# Patient Record
Sex: FEMALE | Race: WHITE | Employment: FULL TIME | URBAN - METROPOLITAN AREA
[De-identification: names, ages, dates, MRNs, and addresses within clinical notes are randomized per-mention and may not be internally consistent; named-entity substitution may affect disease eponyms.]

---

## 2024-09-09 ENCOUNTER — CONSULT (OUTPATIENT)
Dept: GASTROENTEROLOGY | Facility: CLINIC | Age: 44
End: 2024-09-09
Payer: COMMERCIAL

## 2024-09-09 VITALS
WEIGHT: 155 LBS | SYSTOLIC BLOOD PRESSURE: 132 MMHG | BODY MASS INDEX: 22.19 KG/M2 | DIASTOLIC BLOOD PRESSURE: 93 MMHG | HEART RATE: 101 BPM | HEIGHT: 70 IN

## 2024-09-09 DIAGNOSIS — R14.0 ABDOMINAL BLOATING: ICD-10-CM

## 2024-09-09 DIAGNOSIS — R17 JAUNDICE: Primary | ICD-10-CM

## 2024-09-09 DIAGNOSIS — D69.6 THROMBOCYTOPENIA (HCC): ICD-10-CM

## 2024-09-09 DIAGNOSIS — F10.10 ALCOHOL ABUSE: ICD-10-CM

## 2024-09-09 DIAGNOSIS — K70.9 ALCOHOLIC LIVER DISEASE (HCC): ICD-10-CM

## 2024-09-09 PROCEDURE — 99204 OFFICE O/P NEW MOD 45 MIN: CPT | Performed by: NURSE PRACTITIONER

## 2024-09-09 RX ORDER — FUROSEMIDE 20 MG
20 TABLET ORAL
COMMUNITY
Start: 2024-08-12 | End: 2024-09-09 | Stop reason: SDUPTHER

## 2024-09-09 RX ORDER — SPIRONOLACTONE 50 MG/1
50 TABLET, FILM COATED ORAL
COMMUNITY
Start: 2024-08-12 | End: 2024-09-09 | Stop reason: SDUPTHER

## 2024-09-09 RX ORDER — FUROSEMIDE 20 MG
20 TABLET ORAL DAILY
Qty: 30 TABLET | Refills: 0 | Status: SHIPPED | OUTPATIENT
Start: 2024-09-09

## 2024-09-09 RX ORDER — SPIRONOLACTONE 50 MG/1
50 TABLET, FILM COATED ORAL DAILY
Qty: 30 TABLET | Refills: 0 | Status: SHIPPED | OUTPATIENT
Start: 2024-09-09 | End: 2024-10-09

## 2024-09-09 NOTE — PROGRESS NOTES
[facility] [place] - Outpatient Consultation  Juli Matute 44 y.o. female MRN: [id number]  Encounter: [id number]          ASSESSMENT AND PLAN:      1. Jaundice  2. Abdominal bloating  3. Alcohol abuse  4. Thrombocytopenia  Pt with history of alcohol use disorder with recent development of jaundice, abdominal bloating, and lower extremity swelling. She went to [facility] [place] and had labs done 8/6/2024 which showed a total bilirubin of 6.3, direct bilirubin 4.62, AST 86, ALT 30, alkaline phosphatase 264, albumin 3.4.  Platelet count low at 163.  Hepatitis B core total antibody negative, hepatitis B surface antigen negative, hepatitis B surface antibody nonreactive, HCV antibody nonreactive.  She was prescribed Lasix 20 mg/Aldactone 50 mg daily but then was advised to double the dose and ran out after 2 weeks so stopped taking it.  She weaned herself off alcohol slowly and has not had any alcohol for 1 month.  Reports swelling has improved in her lower extremities, however she continues with abdominal bloating and notes the recent development of a dry cough so has been sleeping with head of bed elevated.  She has not had any abdominal imaging and refuses to do so until labs are repeated as she is afraid of what may be seen.  She is, however, agreeable to getting an ultrasound limited.  She denies any abdominal pain.  Discussed labs may be indicative of alcoholic hepatitis +/- cirrhosis, but will need further imaging to rule out any underlying lesions and further evaluation of the biliary tree.  She again refused until repeat labs are drawn.  No PT/INR available to calculate Madrey's discriminant function score or MELD at this time.    -Continue complete alcohol cessation  -Check US limited, obtain IR therapeutic/diagnostic paracentesis  -Restart Lasix 20mg daily, Aldactone 50mg daily.  -Recheck MELD labs in 1 week  -Check hepatitis A Ab, recommend immunization if no antibodies  along with vaccination for hepatitis A  -Nutritionist referral given for info regarding 2g sodium diet & optimizing nutritional status   -Pt refusing imaging of the liver & lungs at todays visit, will reconsider after labs obtained; would then recommend MRI abdomen with and without contrast MRCP as well as chest x-ray  -Follow up with hepatology at next visit  -Will need EGD for variceal surveillance  -Complications of cirrhosis discussed such as hepatic encephalopathy, bleeding, HCC, SBP, and decompensation with continued use of alcohol    -     US abdomen limited; Future; Expected date: 09/09/2024  -     AFP tumor marker; Future  -     IR AMB Paracentesis  -     furosemide (LASIX) 20 mg tablet; Take 1 tablet (20 mg total) by mouth daily  -     spironolactone (ALDACTONE) 50 mg tablet; Take 1 tablet (50 mg total) by mouth daily  -     Ambulatory Referral to Nutrition Services; Future            ______________________________________________________________________    HPI:  Juli Matute is a 44 y.o. female with history of alcohol abuse who presents to establish care due to abnormal LFTs.  She recently started noting jaundice, scleral icterus, abdominal bloating, and lower extremity swelling a couple months ago. She was seen by [facility] and had labs/started on Lasix/Aldactone. Reports she only took for 2 weeks and then stopped when she ran out. She has now weaned off alcohol use for 1 month by herself & has noted swelling has improved, but still present in her abdomen, R ankle, and has noted a cough.    She has a longstanding history alcohol abuse going back to her 20s-30s and used to drink Vodka. She was hospitalized in the past for alcoholic hepatitis at age 29-30 and then went to [facility]. She has been able to decrease alcohol use for periods of time, however over the pandemic, she switched to wine & worked from home & subsequently was drinking heavier.    She denies any confusion, black,  "bloody stool, hematemesis, pruritus, easy bleeding/bruising, nosebleeds. Reports 3 bowel movements daily at baseline.     drinks as well, but she reports he has been supportive of her quitting.  Father has a history of alcoholism    She reports her last colonoscopy 7-8 years ago due to rectal bleeding, was found to have hemorrhoids. Denies any family history of colon cancer.  Has never had an EGD,    Denies IVDU. Former tobacco use. Uses Marijuana.     REVIEW OF SYSTEMS:    CONSTITUTIONAL: Denies any fever, chills, rigors, and weight loss.  HEENT: No earache or tinnitus.  CARDIOVASCULAR: No chest pain or palpitations.   RESPIRATORY: Denies any cough, hemoptysis, shortness of breath or dyspnea on exertion.  GASTROINTESTINAL: As noted in the History of Present Illness.   GENITOURINARY: Denies any hematuria or dysuria.  NEUROLOGIC: No dizziness or vertigo.   MUSCULOSKELETAL: Denies any joint swellings.  SKIN: Denies skin rashes or itching.   ENDOCRINE: Denies excessive thirst. Denies intolerance to heat or cold.  PSYCHOSOCIAL: Denies depression or anxiety. Denies any recent memory loss.       Historical Information   History reviewed. No pertinent past medical history.  Past Surgical History:   Procedure Laterality Date    COLONOSCOPY       Social History   Social History     Substance and Sexual Activity   Alcohol Use None     Social History     Substance and Sexual Activity   Drug Use Yes    Types: Marijuana    Comment: smokes intermittently     Social History     Tobacco Use   Smoking Status Never   Smokeless Tobacco Never     History reviewed. No pertinent family history.    Meds/Allergies       Current Outpatient Medications:     furosemide (LASIX) 20 mg tablet    spironolactone (ALDACTONE) 50 mg tablet    Allergies   Allergen Reactions    Seasonal Ic [Octacosanol] Allergic Rhinitis           Objective     Blood pressure 132/93, pulse 101, height 5' 10\" (1.778 m), weight 70.3 kg (155 lb). Body mass index is " 22.24 kg/m².        PHYSICAL EXAM:      General Appearance:   Alert, cooperative, no distress; scleral icterus present   HEENT:   Normocephalic, atraumatic, anicteric.     Neck:  Supple, symmetrical, trachea midline   Lungs:   Decreased to auscultation bilaterally; no rales, rhonchi or wheezing; respirations unlabored    Heart::   Regular rate and rhythm; no murmur.   Abdomen:   Soft, non-tender, non-distended; normal bowel sounds; no masses, no organomegaly ; ascites present   Genitalia:   Deferred    Rectal:   Deferred    Extremities:  No cyanosis, clubbing; right ankle trace edema    Skin:  jaundice present   Lymph nodes:  No palpable cervical lymphadenopathy        Lab Results:   No visits with results within 1 Day(s) from this visit.   Latest known visit with results is:   No results found for any previous visit.         Radiology Results:   No results found.

## 2024-09-12 ENCOUNTER — APPOINTMENT (OUTPATIENT)
Dept: LAB | Facility: HOSPITAL | Age: 44
End: 2024-09-12
Payer: COMMERCIAL

## 2024-09-12 ENCOUNTER — HOSPITAL ENCOUNTER (OUTPATIENT)
Dept: RADIOLOGY | Facility: HOSPITAL | Age: 44
Discharge: HOME | End: 2024-09-12
Payer: COMMERCIAL

## 2024-09-12 DIAGNOSIS — R14.0 ABDOMINAL BLOATING: ICD-10-CM

## 2024-09-12 DIAGNOSIS — R17 JAUNDICE: ICD-10-CM

## 2024-09-12 LAB
BASOPHILS # BLD AUTO: 0.05 THOUSANDS/ΜL (ref 0–0.1)
BASOPHILS NFR BLD AUTO: 1 % (ref 0–1)
EOSINOPHIL # BLD AUTO: 0.14 THOUSAND/ΜL (ref 0–0.61)
EOSINOPHIL NFR BLD AUTO: 2 % (ref 0–6)
ERYTHROCYTE [DISTWIDTH] IN BLOOD BY AUTOMATED COUNT: 14.7 % (ref 11.6–15.1)
HCT VFR BLD AUTO: 35.2 % (ref 34.8–46.1)
HGB BLD-MCNC: 11.7 G/DL (ref 11.5–15.4)
IMM GRANULOCYTES # BLD AUTO: 0.02 THOUSAND/UL (ref 0–0.2)
IMM GRANULOCYTES NFR BLD AUTO: 0 % (ref 0–2)
INR PPP: 1.48 (ref 0.85–1.19)
LYMPHOCYTES # BLD AUTO: 1.62 THOUSANDS/ΜL (ref 0.6–4.47)
LYMPHOCYTES NFR BLD AUTO: 25 % (ref 14–44)
MCH RBC QN AUTO: 37.1 PG (ref 26.8–34.3)
MCHC RBC AUTO-ENTMCNC: 33.2 G/DL (ref 31.4–37.4)
MCV RBC AUTO: 112 FL (ref 82–98)
MONOCYTES # BLD AUTO: 0.6 THOUSAND/ΜL (ref 0.17–1.22)
MONOCYTES NFR BLD AUTO: 9 % (ref 4–12)
NEUTROPHILS # BLD AUTO: 4.07 THOUSANDS/ΜL (ref 1.85–7.62)
NEUTS SEG NFR BLD AUTO: 63 % (ref 43–75)
NRBC BLD AUTO-RTO: 0 /100 WBCS
PLATELET # BLD AUTO: 149 THOUSANDS/UL (ref 149–390)
PMV BLD AUTO: 9.9 FL (ref 8.9–12.7)
PROTHROMBIN TIME: 18.4 SECONDS (ref 12.3–15)
RBC # BLD AUTO: 3.15 MILLION/UL (ref 3.81–5.12)
WBC # BLD AUTO: 6.5 THOUSAND/UL (ref 4.31–10.16)

## 2024-09-12 PROCEDURE — 85610 PROTHROMBIN TIME: CPT

## 2024-09-12 PROCEDURE — 76705 ECHO EXAM OF ABDOMEN: CPT

## 2024-09-12 PROCEDURE — 85025 COMPLETE CBC W/AUTO DIFF WBC: CPT

## 2024-09-12 PROCEDURE — 36415 COLL VENOUS BLD VENIPUNCTURE: CPT

## 2024-09-13 ENCOUNTER — TELEPHONE (OUTPATIENT)
Dept: GASTROENTEROLOGY | Facility: CLINIC | Age: 44
End: 2024-09-13

## 2024-09-13 NOTE — TELEPHONE ENCOUNTER
Called and relayed results and recommendations to patient  She stated she did not get all the labs done; only got what was required for her upcoming Paracentesis but will get them done after this appt

## 2024-09-13 NOTE — TELEPHONE ENCOUNTER
----- Message from Rhonda DEJESUS sent at 9/13/2024  8:49 AM EDT -----    ----- Message -----  From: ANGELINE Alanis  Sent: 9/12/2024   2:35 PM EDT  To: [facility] [address] [place]    Please let pt know we received results of her CBC & PT/INR but no other blood work results yet. Please check to see if she had it done and call the lab where she had it done to obtain the other results. CBC showed normal blood counts, no signs of anemia, platelet count was low normal. PT/INR was a little elevated which may indicate synthetic dysfunction of the liver, but can also be affected by malnutrition as well. Limited US performed did show moderate amount of ascites (fluid) in the abdomen so she should call IR to schedule paracentesis as ordered in the office if she hasn't already. Thank you

## 2024-09-16 ENCOUNTER — PREP FOR PROCEDURE (OUTPATIENT)
Dept: OTHER | Facility: HOSPITAL | Age: 44
End: 2024-09-16

## 2024-09-16 ENCOUNTER — HOSPITAL ENCOUNTER (OUTPATIENT)
Dept: NON INVASIVE DIAGNOSTICS | Facility: HOSPITAL | Age: 44
Discharge: HOME | End: 2024-09-16
Admitting: RADIOLOGY
Payer: COMMERCIAL

## 2024-09-16 VITALS
HEART RATE: 76 BPM | RESPIRATION RATE: 18 BRPM | DIASTOLIC BLOOD PRESSURE: 82 MMHG | OXYGEN SATURATION: 99 % | SYSTOLIC BLOOD PRESSURE: 127 MMHG

## 2024-09-16 DIAGNOSIS — K70.9 ALCOHOLIC LIVER DISEASE (HCC): Primary | ICD-10-CM

## 2024-09-16 LAB
ALBUMIN FLD-MCNC: <1.5 G/DL
APPEARANCE FLD: NORMAL
COLOR FLD: YELLOW
HISTIOCYTES NFR FLD: 62 %
LYMPHOCYTES NFR BLD AUTO: 10 %
MONO+MESO NFR FLD MANUAL: 9 %
NEUTS BAND NFR FLD MANUAL: 2 %
NEUTS SEG NFR BLD AUTO: 17 %
PROT FLD-MCNC: <3 G/DL
SITE: NORMAL
TOTAL CELLS COUNTED SPEC: 100
TOTAL PROTEIN FLUID: 1.4 G/DL
WBC # FLD MANUAL: 253 /UL

## 2024-09-16 PROCEDURE — 87070 CULTURE OTHR SPECIMN AEROBIC: CPT | Performed by: NURSE PRACTITIONER

## 2024-09-16 PROCEDURE — 82042 OTHER SOURCE ALBUMIN QUAN EA: CPT | Performed by: NURSE PRACTITIONER

## 2024-09-16 PROCEDURE — 88112 CYTOPATH CELL ENHANCE TECH: CPT | Performed by: PATHOLOGY

## 2024-09-16 PROCEDURE — 88305 TISSUE EXAM BY PATHOLOGIST: CPT | Performed by: PATHOLOGY

## 2024-09-16 PROCEDURE — 49083 ABD PARACENTESIS W/IMAGING: CPT

## 2024-09-16 PROCEDURE — 87205 SMEAR GRAM STAIN: CPT | Performed by: NURSE PRACTITIONER

## 2024-09-16 PROCEDURE — 88341 IMHCHEM/IMCYTCHM EA ADD ANTB: CPT | Performed by: PATHOLOGY

## 2024-09-16 PROCEDURE — 88342 IMHCHEM/IMCYTCHM 1ST ANTB: CPT | Performed by: PATHOLOGY

## 2024-09-16 PROCEDURE — 89051 BODY FLUID CELL COUNT: CPT | Performed by: NURSE PRACTITIONER

## 2024-09-16 PROCEDURE — 84157 ASSAY OF PROTEIN OTHER: CPT | Performed by: NURSE PRACTITIONER

## 2024-09-16 RX ORDER — LIDOCAINE WITH 8.4% SOD BICARB 0.9%(10ML)
SYRINGE (ML) INJECTION AS NEEDED
Status: COMPLETED | OUTPATIENT
Start: 2024-09-16 | End: 2024-09-16

## 2024-09-16 RX ADMIN — Medication 8 ML: at 11:36

## 2024-09-16 NOTE — DISCHARGE INSTRUCTIONS
Abdominal Paracentesis     WHAT YOU NEED TO KNOW:   Abdominal paracentesis is a procedure to remove abnormal fluid buildup in your abdomen. Fluid builds up because of liver problems, such as swelling and scarring. Heart failure, kidney disease, a mass, or problems with your pancreas may also cause fluid buildup.     DISCHARGE INSTRUCTIONS:     Follow up with your healthcare provider as directed: Write down your questions so you remember to ask them during your visits.     Wound care: Remove dressing after 24 hours. Leave glue in place.    Return to your normal activities    Contact Interventional Radiology at [phone] ([place] PATIENTS: Contact Interventional Radiology at [phone]) ([place] PATIENTS: Contact Interventional Radiology at [phone]) if:  You have a fever and your wound is red and swollen.   You have yellow, green, or bad-smelling discharge coming from your wound.   You have pain or swelling in your abdomen.   You have an upset stomach or you vomit.   You have sudden, sharp pain in your abdomen.   You urinate very little or not at all.   You feel confused and more tired than usual.   Your arm or leg feels warm, tender, and painful. It may look swollen and red.   You suddenly feel lightheaded and have trouble breathing.

## 2024-09-16 NOTE — SEDATION DOCUMENTATION
Paracentesis completed. 2300 ml clear yellow fluid removed. Bandaid to site. Specimen sent to lab. Patient tolerated well.

## 2024-09-17 ENCOUNTER — TELEPHONE (OUTPATIENT)
Dept: GASTROENTEROLOGY | Facility: CLINIC | Age: 44
End: 2024-09-17

## 2024-09-17 NOTE — TELEPHONE ENCOUNTER
----- Message from ANGELINE Olivas sent at 9/16/2024  5:22 PM EDT -----  Spoke to patient. Fluid studies negative for infection. She is going to have labs done soon and then will be able to check SAAG. Discussed results of US limited indicating cirrhosis with sequela of portal HTN. She is agreeable to scheduling EGD for variceal surveillance. Clerical please schedule this, I placed the order. She has f/u apt with hepatology in December & it would be good to have it done before this if we can. Thank you!

## 2024-09-17 NOTE — TELEPHONE ENCOUNTER
Scheduled date of EGD(as of today): 11/1/24  Physician performing EGD: Dr Hensley   Location of EGD: [facility]   Instructions reviewed with patient by: ls via my chart   Clearances: n/a

## 2024-09-19 LAB
BACTERIA SPEC BFLD CULT: NO GROWTH
GRAM STN SPEC: NORMAL
GRAM STN SPEC: NORMAL

## 2024-09-19 PROCEDURE — 88341 IMHCHEM/IMCYTCHM EA ADD ANTB: CPT | Performed by: PATHOLOGY

## 2024-09-19 PROCEDURE — 88342 IMHCHEM/IMCYTCHM 1ST ANTB: CPT | Performed by: PATHOLOGY

## 2024-09-19 PROCEDURE — 88112 CYTOPATH CELL ENHANCE TECH: CPT | Performed by: PATHOLOGY

## 2024-09-19 PROCEDURE — 88305 TISSUE EXAM BY PATHOLOGIST: CPT | Performed by: PATHOLOGY

## 2024-10-04 DIAGNOSIS — R14.0 ABDOMINAL BLOATING: ICD-10-CM

## 2024-10-04 RX ORDER — SPIRONOLACTONE 50 MG/1
50 TABLET, FILM COATED ORAL DAILY
Qty: 30 TABLET | Refills: 0 | Status: SHIPPED | OUTPATIENT
Start: 2024-10-04 | End: 2024-11-03

## 2024-10-04 RX ORDER — FUROSEMIDE 20 MG
20 TABLET ORAL DAILY
Qty: 30 TABLET | Refills: 0 | Status: SHIPPED | OUTPATIENT
Start: 2024-10-04

## 2024-10-04 NOTE — TELEPHONE ENCOUNTER
Left message informing patient that Rx were sent to pharmacy, and that previously ordered labs need to be completed. To return call with concerns or questions.

## 2024-10-18 ENCOUNTER — ANESTHESIA (OUTPATIENT)
Dept: ANESTHESIOLOGY | Facility: HOSPITAL | Age: 44
End: 2024-10-18

## 2024-10-18 ENCOUNTER — ANESTHESIA EVENT (OUTPATIENT)
Dept: ANESTHESIOLOGY | Facility: HOSPITAL | Age: 44
End: 2024-10-18

## 2024-10-29 ENCOUNTER — TELEPHONE (OUTPATIENT)
Dept: GASTROENTEROLOGY | Facility: CLINIC | Age: 44
End: 2024-10-29

## 2024-10-29 NOTE — TELEPHONE ENCOUNTER
lmom confirming pt's EGD scheduled on 11/1/24 at [facility] with Dr Hensley.  Informed [facility] would be calling the day prior with the arrival time.   Informed would need to be npo after midnight, could do clear liquids up to 4 hours prior to the procedure and would need a  the day of the procedure due to being under sedation. I asked pt to please call back to confirm.

## 2024-11-01 ENCOUNTER — HOSPITAL ENCOUNTER (OUTPATIENT)
Dept: GASTROENTEROLOGY | Facility: AMBULARY SURGERY CENTER | Age: 44
Setting detail: OUTPATIENT SURGERY
End: 2024-11-01
Attending: INTERNAL MEDICINE
Payer: COMMERCIAL

## 2024-11-01 ENCOUNTER — ANESTHESIA (OUTPATIENT)
Dept: GASTROENTEROLOGY | Facility: AMBULARY SURGERY CENTER | Age: 44
End: 2024-11-01
Payer: COMMERCIAL

## 2024-11-01 VITALS
BODY MASS INDEX: 19.33 KG/M2 | TEMPERATURE: 98.5 F | HEART RATE: 86 BPM | OXYGEN SATURATION: 100 % | DIASTOLIC BLOOD PRESSURE: 76 MMHG | SYSTOLIC BLOOD PRESSURE: 123 MMHG | RESPIRATION RATE: 18 BRPM | WEIGHT: 135 LBS | HEIGHT: 70 IN

## 2024-11-01 DIAGNOSIS — K70.9 ALCOHOLIC LIVER DISEASE (HCC): ICD-10-CM

## 2024-11-01 LAB
EXT PREGNANCY TEST URINE: NEGATIVE
EXT. CONTROL: NORMAL

## 2024-11-01 PROCEDURE — 43235 EGD DIAGNOSTIC BRUSH WASH: CPT | Performed by: INTERNAL MEDICINE

## 2024-11-01 PROCEDURE — 81025 URINE PREGNANCY TEST: CPT | Performed by: ANESTHESIOLOGY

## 2024-11-01 RX ORDER — FENTANYL CITRATE 50 UG/ML
INJECTION, SOLUTION INTRAMUSCULAR; INTRAVENOUS AS NEEDED
Status: DISCONTINUED | OUTPATIENT
Start: 2024-11-01 | End: 2024-11-01

## 2024-11-01 RX ORDER — LIDOCAINE HYDROCHLORIDE 20 MG/ML
INJECTION, SOLUTION EPIDURAL; INFILTRATION; INTRACAUDAL; PERINEURAL AS NEEDED
Status: DISCONTINUED | OUTPATIENT
Start: 2024-11-01 | End: 2024-11-01

## 2024-11-01 RX ORDER — PROPOFOL 10 MG/ML
INJECTION, EMULSION INTRAVENOUS AS NEEDED
Status: DISCONTINUED | OUTPATIENT
Start: 2024-11-01 | End: 2024-11-01

## 2024-11-01 RX ORDER — ESTRADIOL 0.1 MG/G
2 CREAM VAGINAL DAILY
COMMUNITY

## 2024-11-01 RX ORDER — SODIUM CHLORIDE, SODIUM LACTATE, POTASSIUM CHLORIDE, CALCIUM CHLORIDE 600; 310; 30; 20 MG/100ML; MG/100ML; MG/100ML; MG/100ML
125 INJECTION, SOLUTION INTRAVENOUS CONTINUOUS
Status: DISCONTINUED | OUTPATIENT
Start: 2024-11-01 | End: 2024-11-05 | Stop reason: HOSPADM

## 2024-11-01 RX ADMIN — LIDOCAINE HYDROCHLORIDE 5 ML: 20 INJECTION, SOLUTION EPIDURAL; INFILTRATION; INTRACAUDAL; PERINEURAL at 08:03

## 2024-11-01 RX ADMIN — PROPOFOL 100 MG: 10 INJECTION, EMULSION INTRAVENOUS at 08:03

## 2024-11-01 RX ADMIN — FENTANYL CITRATE 50 MCG: 50 INJECTION, SOLUTION INTRAMUSCULAR; INTRAVENOUS at 08:01

## 2024-11-01 RX ADMIN — PROPOFOL 20 MG: 10 INJECTION, EMULSION INTRAVENOUS at 08:05

## 2024-11-01 RX ADMIN — PROPOFOL 30 MG: 10 INJECTION, EMULSION INTRAVENOUS at 08:04

## 2024-11-01 RX ADMIN — SODIUM CHLORIDE, SODIUM LACTATE, POTASSIUM CHLORIDE, AND CALCIUM CHLORIDE 125 ML/HR: .6; .31; .03; .02 INJECTION, SOLUTION INTRAVENOUS at 07:34

## 2024-11-01 NOTE — ANESTHESIA POSTPROCEDURE EVALUATION
Post-Op Assessment Note    CV Status:  Stable  Pain Score: 0    Pain management: adequate       Mental Status:  Alert and awake   Hydration Status:  Stable and euvolemic   PONV Controlled:  None   Airway Patency:  Patent     Post Op Vitals Reviewed: Yes    No anethesia notable event occurred.    Staff: CRNA           Last Filed PACU Vitals:  Vitals Value Taken Time   Temp     Pulse 87    /70    Resp 16    SpO2 99        Modified Pritesh:  Activity: 2 (11/1/2024  7:27 AM)  Respiration: 2 (11/1/2024  7:27 AM)  Circulation: 2 (11/1/2024  7:27 AM)  Consciousness: 2 (11/1/2024  7:27 AM)  Oxygen Saturation: 2 (11/1/2024  7:27 AM)  Modified Pritesh Score: 10 (11/1/2024  7:27 AM)

## 2024-11-01 NOTE — ANESTHESIA PREPROCEDURE EVALUATION
Procedure:  EGD    Relevant Problems   GI/HEPATIC   (+) Alcoholic liver disease (HCC)      New diagnosis of alcoholic liver disease. INR elevated, plts normal. S/p paracentesis of 2300ml in September    Smokes marijuana  Physical Exam    Airway    Mallampati score: II  TM Distance: >3 FB  Neck ROM: full     Dental   Comment: Denies loose teeth     Cardiovascular  Cardiovascular exam normal    Pulmonary  Pulmonary exam normal     Other Findings  Portions of exam deferred due to low yield and/or unknown COVID statuspost-pubertal.      Anesthesia Plan  ASA Score- 3     Anesthesia Type- IV sedation with anesthesia with ASA Monitors.         Additional Monitors:     Airway Plan:            Plan Factors-Exercise tolerance (METS): >4 METS.    Chart reviewed.   Existing labs reviewed. Patient summary reviewed.    Patient is a current smoker.              Induction- intravenous.    Postoperative Plan-         Informed Consent- Anesthetic plan and risks discussed with patient.  I personally reviewed this patient with the CRNA. Discussed and agreed on the Anesthesia Plan with the CRNA..

## 2024-11-01 NOTE — H&P
"History and Physical -  [facility]  Juli Matute 44 y.o. female MRN: [id number]        HPI: 44-year-old female with history of alcoholic cirrhosis.  Regular bowel movements.    Historical Information   History reviewed. No pertinent past medical history.  Past Surgical History:   Procedure Laterality Date    COLONOSCOPY      IR PARACENTESIS  9/16/2024     Social History   Social History     Substance and Sexual Activity   Alcohol Use None     Social History     Substance and Sexual Activity   Drug Use Yes    Types: Marijuana    Comment: smokes intermittently     Social History     Tobacco Use   Smoking Status Never   Smokeless Tobacco Never     History reviewed. No pertinent family history.    Meds/Allergies     Not in a hospital admission.    Allergies   Allergen Reactions    Seasonal Ic [Octacosanol] Allergic Rhinitis       Objective     Blood pressure 124/74, pulse 89, temperature 98.5 °F (36.9 °C), temperature source Temporal, resp. rate 18, height 5' 10\" (1.778 m), weight 61.2 kg (135 lb), SpO2 100%.    Physical Exam:    Chest- CTA  Heart- RRR  Abdomen- NT/ND  Extremities- No edema    ASSESSMENT:     History of cirrhosis    PLAN:    EGD              "

## 2024-11-07 DIAGNOSIS — R14.0 ABDOMINAL BLOATING: ICD-10-CM

## 2024-11-08 RX ORDER — FUROSEMIDE 20 MG/1
20 TABLET ORAL DAILY
Qty: 30 TABLET | Refills: 0 | Status: SHIPPED | OUTPATIENT
Start: 2024-11-08

## 2024-11-08 RX ORDER — SPIRONOLACTONE 50 MG/1
50 TABLET, FILM COATED ORAL DAILY
Qty: 30 TABLET | Refills: 0 | Status: SHIPPED | OUTPATIENT
Start: 2024-11-08 | End: 2024-12-08

## 2024-12-26 DIAGNOSIS — R14.0 ABDOMINAL BLOATING: ICD-10-CM

## 2024-12-26 RX ORDER — SPIRONOLACTONE 50 MG/1
50 TABLET, FILM COATED ORAL DAILY
Qty: 30 TABLET | Refills: 5 | Status: SHIPPED | OUTPATIENT
Start: 2024-12-26 | End: 2025-01-25

## 2024-12-26 RX ORDER — FUROSEMIDE 20 MG/1
20 TABLET ORAL DAILY
Qty: 30 TABLET | Refills: 5 | Status: SHIPPED | OUTPATIENT
Start: 2024-12-26

## 2025-02-13 DIAGNOSIS — R14.0 ABDOMINAL BLOATING: ICD-10-CM

## 2025-02-13 RX ORDER — SPIRONOLACTONE 50 MG/1
50 TABLET, FILM COATED ORAL DAILY
Qty: 30 TABLET | Refills: 5 | Status: SHIPPED | OUTPATIENT
Start: 2025-02-13 | End: 2025-08-12

## 2025-02-13 RX ORDER — FUROSEMIDE 20 MG/1
20 TABLET ORAL DAILY
Qty: 30 TABLET | Refills: 5 | Status: SHIPPED | OUTPATIENT
Start: 2025-02-13

## 2025-02-14 NOTE — TELEPHONE ENCOUNTER
Left message informing patient that medication orders have been sent to her pharmacy. Also told patient that blood work needs to be done and orders are in her chart.

## 2025-03-26 DIAGNOSIS — R14.0 ABDOMINAL BLOATING: ICD-10-CM

## 2025-03-26 RX ORDER — FUROSEMIDE 20 MG/1
20 TABLET ORAL DAILY
Qty: 30 TABLET | Refills: 0 | Status: SHIPPED | OUTPATIENT
Start: 2025-03-26

## 2025-03-26 RX ORDER — SPIRONOLACTONE 50 MG/1
50 TABLET, FILM COATED ORAL DAILY
Qty: 30 TABLET | Refills: 0 | Status: SHIPPED | OUTPATIENT
Start: 2025-03-26 | End: 2025-09-22

## 2025-03-27 NOTE — TELEPHONE ENCOUNTER
Left message for patient to let her know that medications have been reordered. Also that labwork needs to be done that was ordered at 9/9/24 office visit.

## 2025-05-12 DIAGNOSIS — R14.0 ABDOMINAL BLOATING: ICD-10-CM

## 2025-05-12 DIAGNOSIS — K70.9 ALCOHOLIC LIVER DISEASE (HCC): Primary | ICD-10-CM

## 2025-05-12 RX ORDER — FUROSEMIDE 20 MG/1
20 TABLET ORAL DAILY
Qty: 30 TABLET | Refills: 0 | OUTPATIENT
Start: 2025-05-12

## 2025-05-12 RX ORDER — SPIRONOLACTONE 50 MG/1
50 TABLET, FILM COATED ORAL DAILY
Qty: 30 TABLET | Refills: 0 | OUTPATIENT
Start: 2025-05-12 | End: 2025-11-08

## 2025-05-21 NOTE — TELEPHONE ENCOUNTER
Called and LVM for patient to schedule a appt with hepatology. LVM for patient to call back.  
Patient needs to have labs done prior to refills of diuretics to make sure her electrolytes and kidney function are stable.  Once blood work is done I can send a refills until office visit with hepatology.  Thank you.  
1.6

## 2025-07-27 ENCOUNTER — APPOINTMENT (EMERGENCY)
Dept: RADIOLOGY | Facility: HOSPITAL | Age: 45
End: 2025-07-27
Payer: COMMERCIAL

## 2025-07-27 ENCOUNTER — HOSPITAL ENCOUNTER (EMERGENCY)
Facility: HOSPITAL | Age: 45
Discharge: HOME | End: 2025-07-27
Attending: EMERGENCY MEDICINE | Admitting: EMERGENCY MEDICINE
Payer: COMMERCIAL

## 2025-07-27 VITALS
DIASTOLIC BLOOD PRESSURE: 67 MMHG | BODY MASS INDEX: 21.39 KG/M2 | HEIGHT: 70 IN | WEIGHT: 149.4 LBS | OXYGEN SATURATION: 97 % | RESPIRATION RATE: 18 BRPM | TEMPERATURE: 98 F | HEART RATE: 88 BPM | SYSTOLIC BLOOD PRESSURE: 152 MMHG

## 2025-07-27 DIAGNOSIS — M79.672 LEFT FOOT PAIN: ICD-10-CM
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
## 2025-07-27 DIAGNOSIS — M25.521 RIGHT ELBOW PAIN: ICD-10-CM

## 2025-07-27 DIAGNOSIS — M25.531 RIGHT WRIST PAIN: ICD-10-CM

## 2025-07-27 PROCEDURE — 73080 X-RAY EXAM OF ELBOW: CPT

## 2025-07-27 PROCEDURE — 90471 IMMUNIZATION ADMIN: CPT

## 2025-07-27 PROCEDURE — 73130 X-RAY EXAM OF HAND: CPT

## 2025-07-27 PROCEDURE — 90715 TDAP VACCINE 7 YRS/> IM: CPT | Performed by: PHYSICIAN ASSISTANT

## 2025-07-27 PROCEDURE — 99284 EMERGENCY DEPT VISIT MOD MDM: CPT | Performed by: PHYSICIAN ASSISTANT

## 2025-07-27 PROCEDURE — 73630 X-RAY EXAM OF FOOT: CPT

## 2025-07-27 PROCEDURE — 99284 EMERGENCY DEPT VISIT MOD MDM: CPT

## 2025-07-27 RX ORDER — GINSENG 100 MG
1 CAPSULE ORAL ONCE
Status: COMPLETED | OUTPATIENT
Start: 2025-07-27 | End: 2025-07-27

## 2025-07-27 RX ADMIN — BACITRACIN ZINC 1 LARGE APPLICATION: 500 OINTMENT TOPICAL at 09:08

## 2025-07-27 RX ADMIN — TETANUS TOXOID, REDUCED DIPHTHERIA TOXOID AND ACELLULAR PERTUSSIS VACCINE, ADSORBED 0.5 ML: 5; 2.5; 8; 8; 2.5 SUSPENSION INTRAMUSCULAR at 09:08
